# Patient Record
Sex: FEMALE | Race: WHITE | NOT HISPANIC OR LATINO | Employment: FULL TIME | URBAN - METROPOLITAN AREA
[De-identification: names, ages, dates, MRNs, and addresses within clinical notes are randomized per-mention and may not be internally consistent; named-entity substitution may affect disease eponyms.]

---

## 2018-01-13 NOTE — MISCELLANEOUS
Provider Comments  Provider Comments:   PT NO SHOWED TODAY      Signatures   Electronically signed by : Karlo Chavarria, ; Tutu 15 2016  1:43PM EST                       (Author)    Electronically signed by : Brandi Virk, ; Jan 18 2016 10:22AM EST                       (Author)    Electronically signed by :  Naty Zepeda MD; Jan 18 2016  2:18PM EST                       (Author)

## 2018-01-16 NOTE — MISCELLANEOUS
Provider Comments  Provider Comments:   PT NO SHOW FOR APPT TODAY      Signatures   Electronically signed by :  Lisseth Carson, ; Mar 14 2016  9:34AM EST                       (Author)    Electronically signed by : TIM Henry ; Mar 14 2016  8:55PM EST                       (Author)    Electronically signed by : TIM Bajwa ; Mar 16 2016  9:56AM EST                       (Author)

## 2018-10-23 ENCOUNTER — APPOINTMENT (OUTPATIENT)
Dept: LAB | Age: 43
End: 2018-10-23

## 2018-10-23 ENCOUNTER — APPOINTMENT (OUTPATIENT)
Dept: LAB | Age: 43
End: 2018-10-23
Attending: PREVENTIVE MEDICINE

## 2018-10-23 ENCOUNTER — TRANSCRIBE ORDERS (OUTPATIENT)
Dept: ADMINISTRATIVE | Age: 43
End: 2018-10-23

## 2018-10-23 DIAGNOSIS — Z00.8 HEALTH EXAMINATION IN POPULATION SURVEY: ICD-10-CM

## 2018-10-23 DIAGNOSIS — Z00.8 HEALTH EXAMINATION IN POPULATION SURVEY: Primary | ICD-10-CM

## 2018-10-23 PROCEDURE — 86765 RUBEOLA ANTIBODY: CPT

## 2018-10-23 PROCEDURE — 86735 MUMPS ANTIBODY: CPT

## 2018-10-23 PROCEDURE — 86480 TB TEST CELL IMMUN MEASURE: CPT

## 2018-10-23 PROCEDURE — 36415 COLL VENOUS BLD VENIPUNCTURE: CPT

## 2018-10-25 LAB
GAMMA INTERFERON BACKGROUND BLD IA-ACNC: 0.03 IU/ML
M TB IFN-G BLD-IMP: NEGATIVE
M TB IFN-G CD4+ BCKGRND COR BLD-ACNC: 0 IU/ML
M TB IFN-G CD4+ BCKGRND COR BLD-ACNC: 0.01 IU/ML
MEV IGG SER QL: NORMAL
MITOGEN IGNF BCKGRD COR BLD-ACNC: >10 IU/ML
MUV IGG SER QL: NORMAL